# Patient Record
Sex: FEMALE | Race: WHITE | NOT HISPANIC OR LATINO | Employment: UNEMPLOYED | URBAN - NONMETROPOLITAN AREA
[De-identification: names, ages, dates, MRNs, and addresses within clinical notes are randomized per-mention and may not be internally consistent; named-entity substitution may affect disease eponyms.]

---

## 2017-03-09 ENCOUNTER — CLINICAL SUPPORT (OUTPATIENT)
Dept: OBSTETRICS AND GYNECOLOGY | Facility: CLINIC | Age: 22
End: 2017-03-09

## 2017-03-09 DIAGNOSIS — Z30.42 SURVEILLANCE FOR DEPO-PROVERA CONTRACEPTION: Primary | ICD-10-CM

## 2017-03-09 RX ORDER — MEDROXYPROGESTERONE ACETATE 150 MG/ML
150 INJECTION, SUSPENSION INTRAMUSCULAR ONCE
Status: COMPLETED | OUTPATIENT
Start: 2017-03-09 | End: 2017-03-09

## 2017-03-09 RX ADMIN — MEDROXYPROGESTERONE ACETATE 150 MG: 150 INJECTION, SUSPENSION INTRAMUSCULAR at 16:06

## 2017-10-16 ENCOUNTER — OFFICE VISIT (OUTPATIENT)
Dept: OBSTETRICS AND GYNECOLOGY | Facility: CLINIC | Age: 22
End: 2017-10-16

## 2017-10-16 VITALS
HEIGHT: 62 IN | BODY MASS INDEX: 20.8 KG/M2 | WEIGHT: 113 LBS | DIASTOLIC BLOOD PRESSURE: 62 MMHG | SYSTOLIC BLOOD PRESSURE: 122 MMHG

## 2017-10-16 DIAGNOSIS — Z30.09 UNWANTED FERTILITY: Primary | ICD-10-CM

## 2017-10-16 PROCEDURE — 99213 OFFICE O/P EST LOW 20 MIN: CPT | Performed by: OBSTETRICS & GYNECOLOGY

## 2017-10-16 RX ORDER — SODIUM CHLORIDE, SODIUM LACTATE, POTASSIUM CHLORIDE, CALCIUM CHLORIDE 600; 310; 30; 20 MG/100ML; MG/100ML; MG/100ML; MG/100ML
125 INJECTION, SOLUTION INTRAVENOUS CONTINUOUS
Status: CANCELLED | OUTPATIENT
Start: 2017-11-29

## 2017-10-16 RX ORDER — SERTRALINE HYDROCHLORIDE 25 MG/1
25 TABLET, FILM COATED ORAL DAILY
COMMUNITY
End: 2022-10-24

## 2017-10-16 RX ORDER — SODIUM CHLORIDE 0.9 % (FLUSH) 0.9 %
1-10 SYRINGE (ML) INJECTION AS NEEDED
Status: CANCELLED | OUTPATIENT
Start: 2017-11-29

## 2017-10-16 RX ORDER — LAMOTRIGINE 25 MG/1
25 TABLET ORAL 2 TIMES DAILY
COMMUNITY
End: 2022-10-24 | Stop reason: SDUPTHER

## 2017-10-16 RX ORDER — LEVETIRACETAM 250 MG/1
500 TABLET ORAL 2 TIMES DAILY
COMMUNITY
End: 2017-11-14 | Stop reason: DRUGHIGH

## 2017-10-16 NOTE — PROGRESS NOTES
"  Chief Complaint   Patient presents with   • Contraception     tubal consult     Cherelle Acosta is a 22 y.o. year old .  No LMP recorded. Patient has had an injection.  She presents with a chief complaint of Wanting to get her tubes tied.  She would like an Essure method done..         Past Medical History:   Diagnosis Date   • Factor V Leiden mutation      History reviewed. No pertinent surgical history.    Current Outpatient Prescriptions:   •  lamoTRIgine (LaMICtal) 25 MG tablet, Take 25 mg by mouth Daily., Disp: , Rfl:   •  levETIRAcetam (KEPPRA) 250 MG tablet, Take 250 mg by mouth 2 (Two) Times a Day., Disp: , Rfl:   •  sertraline (ZOLOFT) 25 MG tablet, Take 25 mg by mouth Daily., Disp: , Rfl:   No Known Allergies  Smoking status: Current Every Day Smoker                                                   Packs/day: 0.00      Years: 0.00      Smokeless status: Never Used                        Review of Systems   Constitutional: Negative for activity change, appetite change, chills and fever.   Gastrointestinal: Negative for abdominal distention and abdominal pain.   Genitourinary: Negative for difficulty urinating, dysuria, flank pain, genital sores, pelvic pain, vaginal bleeding, vaginal discharge and vaginal pain.        /62  Ht 62\" (157.5 cm)  Wt 113 lb (51.3 kg)  Breastfeeding? No  BMI 20.67 kg/m2    General:  well developed; well nourished  no acute distress   Thyroid: not examined   Lungs:  breathing is unlabored   Heart:  Not performed.   Breasts:  Not performed.   Abdomen: Not performed.   Pelvis: Not performed.     Lab Review   No data reviewed      Imaging   No data reviewed    Assessment      Diagnosis Plan   1. Unwanted fertility            Plan   The patient understands that tubal ligation should be considered permanent and not reversible.  And that if there is any thought in the back of her mind that she would like to  have another child; she should not get her tubes tied.  " Although reversal surgeries are possible, they may not work, and are usually not covered by insurance.  She further understands that the only perfect method of birth control is to not engage in intercourse.  All other methods of birth control have a failure rate.  She understands that tubal ligation has a failure rate.  Should she believe that she is pregnant after her surgery, she should do pregnancy test, and if it is positive, we would want to see her right away as she would have a high risk for having a tubal or an ectopic pregnancy.  She further understands that because she so young that she has about a 33% chance of regretting her decision by the time she turns 30.  We discussed alternate methods of birth control including an IUD but she still wishes to proceed and get her tubes tied.  She will read and sign her tubal papers today.  She would like an Essure method tubal ligation.         This note was electronically signed.    Chuck Canas MD  October 16, 2017

## 2017-11-14 ENCOUNTER — APPOINTMENT (OUTPATIENT)
Dept: PREADMISSION TESTING | Facility: HOSPITAL | Age: 22
End: 2017-11-14

## 2017-11-14 ENCOUNTER — OFFICE VISIT (OUTPATIENT)
Dept: OBSTETRICS AND GYNECOLOGY | Facility: CLINIC | Age: 22
End: 2017-11-14

## 2017-11-14 VITALS
BODY MASS INDEX: 20.24 KG/M2 | DIASTOLIC BLOOD PRESSURE: 54 MMHG | OXYGEN SATURATION: 99 % | RESPIRATION RATE: 18 BRPM | HEIGHT: 62 IN | HEART RATE: 85 BPM | SYSTOLIC BLOOD PRESSURE: 90 MMHG | WEIGHT: 110 LBS

## 2017-11-14 VITALS
BODY MASS INDEX: 20.61 KG/M2 | DIASTOLIC BLOOD PRESSURE: 64 MMHG | SYSTOLIC BLOOD PRESSURE: 98 MMHG | WEIGHT: 112 LBS | HEIGHT: 62 IN

## 2017-11-14 DIAGNOSIS — Z30.09 UNWANTED FERTILITY: Primary | ICD-10-CM

## 2017-11-14 DIAGNOSIS — Z30.09 UNWANTED FERTILITY: ICD-10-CM

## 2017-11-14 LAB
BASOPHILS # BLD AUTO: 0.03 10*3/MM3 (ref 0–0.2)
BASOPHILS NFR BLD AUTO: 0.4 % (ref 0–2)
DEPRECATED RDW RBC AUTO: 38 FL (ref 36.4–46.3)
EOSINOPHIL # BLD AUTO: 0.03 10*3/MM3 (ref 0–0.7)
EOSINOPHIL NFR BLD AUTO: 0.4 % (ref 0–7)
ERYTHROCYTE [DISTWIDTH] IN BLOOD BY AUTOMATED COUNT: 11.8 % (ref 11.5–14.5)
HCT VFR BLD AUTO: 40.3 % (ref 35–45)
HGB BLD-MCNC: 13.8 G/DL (ref 12–15.5)
IMM GRANULOCYTES # BLD: 0.01 10*3/MM3 (ref 0–0.02)
IMM GRANULOCYTES NFR BLD: 0.1 % (ref 0–0.5)
LYMPHOCYTES # BLD AUTO: 2.83 10*3/MM3 (ref 0.6–4.2)
LYMPHOCYTES NFR BLD AUTO: 36.5 % (ref 10–50)
MCH RBC QN AUTO: 30.3 PG (ref 26.5–34)
MCHC RBC AUTO-ENTMCNC: 34.2 G/DL (ref 31.4–36)
MCV RBC AUTO: 88.4 FL (ref 80–98)
MONOCYTES # BLD AUTO: 0.52 10*3/MM3 (ref 0–0.9)
MONOCYTES NFR BLD AUTO: 6.7 % (ref 0–12)
NEUTROPHILS # BLD AUTO: 4.34 10*3/MM3 (ref 2–8.6)
NEUTROPHILS NFR BLD AUTO: 55.9 % (ref 37–80)
PLATELET # BLD AUTO: 277 10*3/MM3 (ref 150–450)
PMV BLD AUTO: 11.1 FL (ref 8–12)
RBC # BLD AUTO: 4.56 10*6/MM3 (ref 3.77–5.16)
WBC NRBC COR # BLD: 7.76 10*3/MM3 (ref 3.2–9.8)

## 2017-11-14 PROCEDURE — 99213 OFFICE O/P EST LOW 20 MIN: CPT | Performed by: OBSTETRICS & GYNECOLOGY

## 2017-11-14 PROCEDURE — 85025 COMPLETE CBC W/AUTO DIFF WBC: CPT | Performed by: OBSTETRICS & GYNECOLOGY

## 2017-11-14 PROCEDURE — 36415 COLL VENOUS BLD VENIPUNCTURE: CPT

## 2017-11-14 RX ORDER — LEVETIRACETAM 500 MG/1
500 TABLET ORAL 2 TIMES DAILY
COMMUNITY
End: 2022-10-24 | Stop reason: DRUGHIGH

## 2017-11-14 NOTE — H&P
"Cherelle Acosta  : 1995  MRN: [id number]  CSN: [id number]    History and Physical    Cherelle Acosta is a 22 y.o. year old  who presents for a preoperative history and physical prior to undergoing an exam under anesthesia and Essure procedure.  Cherelle would like to get her tubes tied.  She would like to use Depo-Provera for the 3 months until she has her HSG performed..    Past Medical History:   Diagnosis Date   • Factor V Leiden mutation      OB History      Para Term  AB Living    2 2 2 0 0 2    SAB TAB Ectopic Multiple Live Births    0 0 0 0 2        No past surgical history on file.  Smoking status: Current Every Day Smoker                                                   Packs/day: 0.00      Years: 0.00      Smokeless status: Never Used                          Current Outpatient Prescriptions:   •  lamoTRIgine (LaMICtal) 25 MG tablet, Take 25 mg by mouth Daily., Disp: , Rfl:   •  levETIRAcetam (KEPPRA) 250 MG tablet, Take 500 mg by mouth 2 (Two) Times a Day., Disp: , Rfl:   •  sertraline (ZOLOFT) 25 MG tablet, Take 25 mg by mouth Daily., Disp: , Rfl:   Prior to Admission medications    Medication Sig Start Date End Date Taking? Authorizing Provider   lamoTRIgine (LaMICtal) 25 MG tablet Take 25 mg by mouth Daily.   Yes Historical Provider, MD   levETIRAcetam (KEPPRA) 250 MG tablet Take 500 mg by mouth 2 (Two) Times a Day.   Yes Historical Provider, MD   sertraline (ZOLOFT) 25 MG tablet Take 25 mg by mouth Daily.   Yes Historical Provider, MD       No Known Allergies    Review of Systems   Constitutional: Negative for activity change, appetite change, chills and fever.   Gastrointestinal: Negative for abdominal distention and abdominal pain.   Genitourinary: Negative for difficulty urinating, dysuria, flank pain, genital sores, pelvic pain, vaginal bleeding, vaginal discharge and vaginal pain.           BP 98/64  Ht 62\" (157.5 cm)  Wt 112 lb (50.8 kg)  BMI 20.49 " kg/m2  General: well developed; well nourished  no acute distress   thryoid: normal to inspection and palpation   Heart: regular rate and rhythm   Lungs: breathing is unlabored  clear to auscultation bilaterally   Abdomen: soft, non-tender; no masses  no umbilical or inginual hernias are present  no hepato-splenomegaly   Pelvic: Not performed.  The pelvic exam is deferred to the OR   Neuro: Alert and oriented x 3, reflexes normal and symmetric   Labs  Lab Results   Component Value Date     01/24/2016    HGB 10.9 (L) 01/24/2016    HCT 31.2 (L) 01/24/2016    WBC 16.2 (H) 01/24/2016     (L) 10/27/2015    K 3.6 10/27/2015     10/27/2015    CO2 20 (L) 10/27/2015    BUN 8 10/27/2015    CREATININE 0.4 (L) 10/27/2015    GLUCOSE 75 10/27/2015    ALBUMIN 3.4 10/27/2015    CALCIUM 9.3 10/27/2015    AST 18 10/27/2015    ALT 25 10/27/2015    BILITOT 0.5 10/27/2015       Assessment   1. Unwanted fertility, desires sterility     Plan   1.  The patient will be taken the operating room to undergo an exam under anesthesia and Essure procedure.  If we are unable to complete the Essure procedure she would like for us to go from above.  Today I discussed with Cherelle the risks, benefits, and alternativesof  her upcoming surgical procedure. The risks that were discussed included, but  were not limited to: pain, bleeding including the need and risks of transfusion to  which she would have no objection, infection at the site of surgery, damage to  the adjacent surrounding organs,  catheter introduced urinary tract infections and  the small risk for deep vein thrombosis were all explained. Additionally, the small  risk for reoperation in the event of unanticipated bleeding or surgical injury was  discussed.  All of her questions were answered fully.  She left with a very clear  understanding of the preoperative surgical indications and the nature of the  surgery for which she is scheduled.  She understands to be NPO after  midnight.            This document has been electronically signed by Chuck Canas MD on November 14, 2017 10:11 AM

## 2017-11-14 NOTE — DISCHARGE INSTRUCTIONS
[facility]  Pre-op Information and Guidelines    You will be called after 2 p.m. the day before your surgery (Friday for Monday surgery) and notified of your time for arrival and approximate surgery time.  If you have not received a call by 4P.M., please contact Same Day Surgery at [phone] of if outside [facility] call [phone].    Please Follow these Important Safety Guidelines:    • The morning of your procedure, take only the medications listed below with   A sip of water:_____________________________________________       ______________________________________________    • DO NOT eat or drink anything after 12:00 midnight the night before surgery  Specific instructions concerning drinking clear liquids will be discussed during  the pre-surgery instruction call the day before your surgery.    • If you take a blood thinner (ex. Plavix, Coumadin, aspirin), ask your doctor when to stop it before surgery  STOP DATE: _________________    • Only 2 visitors are allowed in patient rooms at a time  Your visitors will be asked to wait in the lobby until the admission process is complete with the exception of a parent with a child and patients in need of special assistance.    • YOU CANNOT DRIVE YOURSELF HOME  You must be accompanied by someone who will be responsible for driving you home after surgery and for your care at home.    • DO NOT chew gum, use breath mints, hard candy, or smoke the day of surgery  • DO NOT drink alcohol for at least 24 hours before your surgery  • DO NOT wear any jewelry and remove all body piercing before coming to the hospital  • DO NOT wear make-up to the hospital  • If you are having surgery on an extremity (arm/leg/foot) remove nail polish/artificial nails on the surgical side  • Clothing, glasses, contacts, dentures, and hairpieces must be removed before surgery  • Bathe the night before or the morning of your surgery and do not use powders/lotions on  skin.

## 2017-11-29 ENCOUNTER — ANESTHESIA EVENT (OUTPATIENT)
Dept: PERIOP | Facility: HOSPITAL | Age: 22
End: 2017-11-29

## 2017-11-29 ENCOUNTER — HOSPITAL ENCOUNTER (OUTPATIENT)
Facility: HOSPITAL | Age: 22
Setting detail: HOSPITAL OUTPATIENT SURGERY
Discharge: HOME | End: 2017-11-29
Attending: OBSTETRICS & GYNECOLOGY | Admitting: OBSTETRICS & GYNECOLOGY

## 2017-11-29 ENCOUNTER — ANESTHESIA (OUTPATIENT)
Dept: PERIOP | Facility: HOSPITAL | Age: 22
End: 2017-11-29

## 2017-11-29 VITALS
RESPIRATION RATE: 18 BRPM | OXYGEN SATURATION: 100 % | DIASTOLIC BLOOD PRESSURE: 77 MMHG | TEMPERATURE: 98.3 F | WEIGHT: 110.01 LBS | HEIGHT: 62 IN | HEART RATE: 72 BPM | BODY MASS INDEX: 20.24 KG/M2 | SYSTOLIC BLOOD PRESSURE: 111 MMHG

## 2017-11-29 DIAGNOSIS — Z30.09 UNWANTED FERTILITY: ICD-10-CM

## 2017-11-29 LAB
AMPHET+METHAMPHET UR QL: NEGATIVE
B-HCG UR QL: NEGATIVE
BARBITURATES UR QL SCN: NEGATIVE
BENZODIAZ UR QL SCN: NEGATIVE
CANNABINOIDS SERPL QL: POSITIVE
COCAINE UR QL: NEGATIVE
METHADONE UR QL SCN: NEGATIVE
OPIATES UR QL: NEGATIVE
OXYCODONE UR QL SCN: NEGATIVE

## 2017-11-29 PROCEDURE — 80307 DRUG TEST PRSMV CHEM ANLYZR: CPT | Performed by: ANESTHESIOLOGY

## 2017-11-29 PROCEDURE — 25010000002 KETOROLAC TROMETHAMINE PER 15 MG: Performed by: NURSE ANESTHETIST, CERTIFIED REGISTERED

## 2017-11-29 PROCEDURE — 25010000002 DEXAMETHASONE PER 1 MG: Performed by: NURSE ANESTHETIST, CERTIFIED REGISTERED

## 2017-11-29 PROCEDURE — 58565 HYSTEROSCOPY STERILIZATION: CPT | Performed by: OBSTETRICS & GYNECOLOGY

## 2017-11-29 PROCEDURE — 25010000002 FENTANYL CITRATE (PF) 100 MCG/2ML SOLUTION: Performed by: NURSE ANESTHETIST, CERTIFIED REGISTERED

## 2017-11-29 PROCEDURE — 25010000002 PROPOFOL 10 MG/ML EMULSION: Performed by: NURSE ANESTHETIST, CERTIFIED REGISTERED

## 2017-11-29 PROCEDURE — 25010000002 MIDAZOLAM PER 1 MG: Performed by: NURSE ANESTHETIST, CERTIFIED REGISTERED

## 2017-11-29 PROCEDURE — 25010000002 HYDROMORPHONE PER 4 MG: Performed by: NURSE ANESTHETIST, CERTIFIED REGISTERED

## 2017-11-29 PROCEDURE — 81025 URINE PREGNANCY TEST: CPT | Performed by: OBSTETRICS & GYNECOLOGY

## 2017-11-29 PROCEDURE — 25010000002 ONDANSETRON PER 1 MG: Performed by: NURSE ANESTHETIST, CERTIFIED REGISTERED

## 2017-11-29 DEVICE — SYS CONTRL BIRTH ESSURE PERM ST: Type: IMPLANTABLE DEVICE | Status: FUNCTIONAL

## 2017-11-29 RX ORDER — FLUMAZENIL 0.1 MG/ML
0.2 INJECTION INTRAVENOUS AS NEEDED
Status: DISCONTINUED | OUTPATIENT
Start: 2017-11-29 | End: 2017-11-29 | Stop reason: HOSPADM

## 2017-11-29 RX ORDER — MEDROXYPROGESTERONE ACETATE 150 MG/ML
150 INJECTION, SUSPENSION INTRAMUSCULAR ONCE
Status: COMPLETED | OUTPATIENT
Start: 2017-11-29 | End: 2017-11-29

## 2017-11-29 RX ORDER — EPHEDRINE SULFATE 50 MG/ML
5 INJECTION, SOLUTION INTRAVENOUS ONCE AS NEEDED
Status: DISCONTINUED | OUTPATIENT
Start: 2017-11-29 | End: 2017-11-29 | Stop reason: HOSPADM

## 2017-11-29 RX ORDER — ACETAMINOPHEN 325 MG/1
650 TABLET ORAL ONCE AS NEEDED
Status: DISCONTINUED | OUTPATIENT
Start: 2017-11-29 | End: 2017-11-29 | Stop reason: HOSPADM

## 2017-11-29 RX ORDER — OXYCODONE HYDROCHLORIDE AND ACETAMINOPHEN 5; 325 MG/1; MG/1
1 TABLET ORAL ONCE AS NEEDED
Status: DISCONTINUED | OUTPATIENT
Start: 2017-11-29 | End: 2017-11-29 | Stop reason: HOSPADM

## 2017-11-29 RX ORDER — IBUPROFEN 800 MG/1
800 TABLET ORAL EVERY 6 HOURS
Status: DISCONTINUED | OUTPATIENT
Start: 2017-11-29 | End: 2017-11-29 | Stop reason: HOSPADM

## 2017-11-29 RX ORDER — LIDOCAINE HYDROCHLORIDE 20 MG/ML
INJECTION, SOLUTION INFILTRATION; PERINEURAL AS NEEDED
Status: DISCONTINUED | OUTPATIENT
Start: 2017-11-29 | End: 2017-11-29 | Stop reason: SURG

## 2017-11-29 RX ORDER — KETOROLAC TROMETHAMINE 30 MG/ML
INJECTION, SOLUTION INTRAMUSCULAR; INTRAVENOUS AS NEEDED
Status: DISCONTINUED | OUTPATIENT
Start: 2017-11-29 | End: 2017-11-29 | Stop reason: SURG

## 2017-11-29 RX ORDER — DIPHENHYDRAMINE HYDROCHLORIDE 50 MG/ML
12.5 INJECTION INTRAMUSCULAR; INTRAVENOUS
Status: DISCONTINUED | OUTPATIENT
Start: 2017-11-29 | End: 2017-11-29 | Stop reason: HOSPADM

## 2017-11-29 RX ORDER — ACETAMINOPHEN 650 MG/1
650 SUPPOSITORY RECTAL ONCE AS NEEDED
Status: DISCONTINUED | OUTPATIENT
Start: 2017-11-29 | End: 2017-11-29 | Stop reason: HOSPADM

## 2017-11-29 RX ORDER — ONDANSETRON 2 MG/ML
INJECTION INTRAMUSCULAR; INTRAVENOUS AS NEEDED
Status: DISCONTINUED | OUTPATIENT
Start: 2017-11-29 | End: 2017-11-29 | Stop reason: SURG

## 2017-11-29 RX ORDER — DEXAMETHASONE SODIUM PHOSPHATE 4 MG/ML
INJECTION, SOLUTION INTRA-ARTICULAR; INTRALESIONAL; INTRAMUSCULAR; INTRAVENOUS; SOFT TISSUE AS NEEDED
Status: DISCONTINUED | OUTPATIENT
Start: 2017-11-29 | End: 2017-11-29 | Stop reason: SURG

## 2017-11-29 RX ORDER — SODIUM CHLORIDE 0.9 % (FLUSH) 0.9 %
1-10 SYRINGE (ML) INJECTION AS NEEDED
Status: DISCONTINUED | OUTPATIENT
Start: 2017-11-29 | End: 2017-11-29 | Stop reason: HOSPADM

## 2017-11-29 RX ORDER — FENTANYL CITRATE 50 UG/ML
INJECTION, SOLUTION INTRAMUSCULAR; INTRAVENOUS AS NEEDED
Status: DISCONTINUED | OUTPATIENT
Start: 2017-11-29 | End: 2017-11-29 | Stop reason: SURG

## 2017-11-29 RX ORDER — SODIUM CHLORIDE, SODIUM LACTATE, POTASSIUM CHLORIDE, CALCIUM CHLORIDE 600; 310; 30; 20 MG/100ML; MG/100ML; MG/100ML; MG/100ML
125 INJECTION, SOLUTION INTRAVENOUS CONTINUOUS
Status: DISCONTINUED | OUTPATIENT
Start: 2017-11-29 | End: 2017-11-29 | Stop reason: HOSPADM

## 2017-11-29 RX ORDER — ONDANSETRON 2 MG/ML
4 INJECTION INTRAMUSCULAR; INTRAVENOUS ONCE AS NEEDED
Status: DISCONTINUED | OUTPATIENT
Start: 2017-11-29 | End: 2017-11-29 | Stop reason: HOSPADM

## 2017-11-29 RX ORDER — PROMETHAZINE HYDROCHLORIDE 25 MG/ML
12.5 INJECTION, SOLUTION INTRAMUSCULAR; INTRAVENOUS ONCE AS NEEDED
Status: DISCONTINUED | OUTPATIENT
Start: 2017-11-29 | End: 2017-11-29 | Stop reason: HOSPADM

## 2017-11-29 RX ORDER — NALOXONE HCL 0.4 MG/ML
0.2 VIAL (ML) INJECTION AS NEEDED
Status: DISCONTINUED | OUTPATIENT
Start: 2017-11-29 | End: 2017-11-29 | Stop reason: HOSPADM

## 2017-11-29 RX ORDER — MIDAZOLAM HYDROCHLORIDE 1 MG/ML
INJECTION INTRAMUSCULAR; INTRAVENOUS AS NEEDED
Status: DISCONTINUED | OUTPATIENT
Start: 2017-11-29 | End: 2017-11-29 | Stop reason: SURG

## 2017-11-29 RX ORDER — PROPOFOL 10 MG/ML
VIAL (ML) INTRAVENOUS AS NEEDED
Status: DISCONTINUED | OUTPATIENT
Start: 2017-11-29 | End: 2017-11-29 | Stop reason: SURG

## 2017-11-29 RX ORDER — HYDROMORPHONE HCL 110MG/55ML
0.5 PATIENT CONTROLLED ANALGESIA SYRINGE INTRAVENOUS
Status: DISCONTINUED | OUTPATIENT
Start: 2017-11-29 | End: 2017-11-29 | Stop reason: HOSPADM

## 2017-11-29 RX ORDER — LABETALOL HYDROCHLORIDE 5 MG/ML
5 INJECTION, SOLUTION INTRAVENOUS
Status: DISCONTINUED | OUTPATIENT
Start: 2017-11-29 | End: 2017-11-29 | Stop reason: HOSPADM

## 2017-11-29 RX ORDER — IBUPROFEN 600 MG/1
600 TABLET ORAL EVERY 6 HOURS PRN
Qty: 30 TABLET | Refills: 1 | Status: SHIPPED | OUTPATIENT
Start: 2017-11-29 | End: 2022-11-22 | Stop reason: ALTCHOICE

## 2017-11-29 RX ADMIN — PROPOFOL 150 MG: 10 INJECTION, EMULSION INTRAVENOUS at 10:09

## 2017-11-29 RX ADMIN — FENTANYL CITRATE 50 MCG: 50 INJECTION, SOLUTION INTRAMUSCULAR; INTRAVENOUS at 10:05

## 2017-11-29 RX ADMIN — SODIUM CHLORIDE, POTASSIUM CHLORIDE, SODIUM LACTATE AND CALCIUM CHLORIDE 125 ML/HR: 600; 310; 30; 20 INJECTION, SOLUTION INTRAVENOUS at 07:06

## 2017-11-29 RX ADMIN — PROPOFOL 50 MG: 10 INJECTION, EMULSION INTRAVENOUS at 10:10

## 2017-11-29 RX ADMIN — HYDROMORPHONE HYDROCHLORIDE 0.5 MG: 2 INJECTION INTRAMUSCULAR; INTRAVENOUS; SUBCUTANEOUS at 11:11

## 2017-11-29 RX ADMIN — FENTANYL CITRATE 50 MCG: 50 INJECTION, SOLUTION INTRAMUSCULAR; INTRAVENOUS at 10:03

## 2017-11-29 RX ADMIN — MEDROXYPROGESTERONE ACETATE 150 MG: 150 INJECTION, SUSPENSION INTRAMUSCULAR at 12:35

## 2017-11-29 RX ADMIN — HYDROMORPHONE HYDROCHLORIDE 0.5 MG: 2 INJECTION INTRAMUSCULAR; INTRAVENOUS; SUBCUTANEOUS at 11:00

## 2017-11-29 RX ADMIN — LIDOCAINE HYDROCHLORIDE 50 MG: 20 INJECTION, SOLUTION INFILTRATION; PERINEURAL at 10:09

## 2017-11-29 RX ADMIN — DEXAMETHASONE SODIUM PHOSPHATE 4 MG: 4 INJECTION, SOLUTION INTRAMUSCULAR; INTRAVENOUS at 10:13

## 2017-11-29 RX ADMIN — MIDAZOLAM 2 MG: 1 INJECTION INTRAMUSCULAR; INTRAVENOUS at 10:03

## 2017-11-29 RX ADMIN — ONDANSETRON 4 MG: 2 INJECTION INTRAMUSCULAR; INTRAVENOUS at 10:13

## 2017-11-29 RX ADMIN — KETOROLAC TROMETHAMINE 30 MG: 30 INJECTION, SOLUTION INTRAMUSCULAR at 10:13

## 2017-11-29 NOTE — ANESTHESIA PROCEDURE NOTES
Airway  Urgency: elective    Airway not difficult    General Information and Staff    Patient location during procedure: OR  CRNA: TIMOTHY MCKINLEY    Indications and Patient Condition    Preoxygenated: yes  Mask difficulty assessment: 0 - not attempted    Final Airway Details  Final airway type: supraglottic airway      Successful airway: classic  Size 4    Number of attempts at approach: 1    Additional Comments  Lips and teeth in preanesthetic condition

## 2017-11-29 NOTE — ANESTHESIA PREPROCEDURE EVALUATION
Anesthesia Evaluation     NPO Solid Status: > 8 hours  NPO Liquid Status: > 8 hours     Airway   Mallampati: II  TM distance: >3 FB  Neck ROM: full  no difficulty expected  Dental    (+) poor dentition    Pulmonary     breath sounds clear to auscultation  (+) a smoker Current,   Cardiovascular     Rhythm: regular  Rate: normal      ROS comment: Factor V leiden deficiency noted in history and as such is at higher risk for thrombotic phenomena.    Neuro/Psych  (+) seizures well controlled, psychiatric history Anxiety,    GI/Hepatic/Renal/Endo      Musculoskeletal     Abdominal   (+) scaphoid    Abdomen: soft.   Substance History   (+) drug use (admits to marijuana use but denies any other drug use such as methamphetamine)     OB/GYN          Other   (+) arthritis                                   Anesthesia Plan    ASA 2     general     intravenous induction   Anesthetic plan and risks discussed with patient.

## 2017-11-29 NOTE — ANESTHESIA POSTPROCEDURE EVALUATION
Patient: Cherelle Acosta    Procedure Summary     Date Anesthesia Start Anesthesia Stop Room / Location    11/29/17 1003 1037 [facility] OR 10 / [facility] OR       Procedure Diagnosis Surgeon Provider    EXAMINATION UNDER ANESTHESIA, HYSTEROSCOPY WITH  ESSURE PROCEDURE (N/A Vagina) Unwanted fertility  (Unwanted fertility [Z30.09]) MD Jason Small MD          Anesthesia Type: general  Last vitals  BP   117/77 (11/29/17 1035)   Temp   97.8 °F (36.6 °C) (11/29/17 1035)   Pulse   83 (11/29/17 1035)   Resp   14 (11/29/17 1035)     SpO2   99 % (11/29/17 1035)     Post Anesthesia Care and Evaluation    Patient location during evaluation: PACU  Patient participation: complete - patient participated  Level of consciousness: awake and awake and alert  Pain management: adequate  Airway patency: patent  Anesthetic complications: No anesthetic complications  PONV Status: none  Cardiovascular status: acceptable  Respiratory status: acceptable  Hydration status: acceptable

## 2017-12-07 ENCOUNTER — OFFICE VISIT (OUTPATIENT)
Dept: OBSTETRICS AND GYNECOLOGY | Facility: CLINIC | Age: 22
End: 2017-12-07

## 2017-12-07 DIAGNOSIS — Z09 EXAMINATION FOLLOWING SURGERY: Primary | ICD-10-CM

## 2017-12-07 DIAGNOSIS — Z01.812 PRE-PROCEDURE LAB EXAM: ICD-10-CM

## 2017-12-07 DIAGNOSIS — Z98.51 STATUS POST TUBAL LIGATION: ICD-10-CM

## 2017-12-07 PROCEDURE — 99024 POSTOP FOLLOW-UP VISIT: CPT | Performed by: OBSTETRICS & GYNECOLOGY

## 2017-12-07 NOTE — PROGRESS NOTES
HISTORY:  The patient presents for her 1 week postop check following an Essure procedure.  She is doing well at home and has no complaints.      PHYSICAL EXAMINATION:   IN GENERAL:  A well-developed, well-nourished female in no acute distress.   ABDOMEN:  Soft and nontender.                    ASSESSMENT:  Surgical follow-up.     PLAN:  The patient understands she needs to have her HSG done in 3 months to ensure that her tubes are blocked.  She did receive Depo-Provera the day of surgery.  She is to use a backup method for the next month.  Otherwise, she may resume all normal activities.

## 2018-03-07 ENCOUNTER — TELEPHONE (OUTPATIENT)
Dept: GENERAL RADIOLOGY | Facility: HOSPITAL | Age: 23
End: 2018-03-07

## 2022-10-24 ENCOUNTER — OFFICE VISIT (OUTPATIENT)
Dept: FAMILY MEDICINE CLINIC | Facility: CLINIC | Age: 27
End: 2022-10-24

## 2022-10-24 VITALS
SYSTOLIC BLOOD PRESSURE: 80 MMHG | OXYGEN SATURATION: 97 % | HEART RATE: 94 BPM | HEIGHT: 64 IN | WEIGHT: 110 LBS | BODY MASS INDEX: 18.78 KG/M2 | DIASTOLIC BLOOD PRESSURE: 64 MMHG

## 2022-10-24 DIAGNOSIS — D68.51 FACTOR V LEIDEN MUTATION: Chronic | ICD-10-CM

## 2022-10-24 DIAGNOSIS — R10.11 RUQ ABDOMINAL PAIN: ICD-10-CM

## 2022-10-24 DIAGNOSIS — Z76.89 ENCOUNTER TO ESTABLISH CARE: ICD-10-CM

## 2022-10-24 DIAGNOSIS — F12.90 MARIJUANA USE: ICD-10-CM

## 2022-10-24 DIAGNOSIS — Z23 NEED FOR VACCINATION: ICD-10-CM

## 2022-10-24 DIAGNOSIS — G43.709 CHRONIC MIGRAINE WITHOUT AURA WITHOUT STATUS MIGRAINOSUS, NOT INTRACTABLE: Chronic | ICD-10-CM

## 2022-10-24 DIAGNOSIS — F17.200 SMOKER: ICD-10-CM

## 2022-10-24 DIAGNOSIS — F33.1 MAJOR DEPRESSIVE DISORDER, RECURRENT EPISODE, MODERATE DEGREE: Chronic | ICD-10-CM

## 2022-10-24 DIAGNOSIS — F41.9 ANXIETY: Chronic | ICD-10-CM

## 2022-10-24 DIAGNOSIS — Z00.00 ANNUAL PHYSICAL EXAM: Primary | ICD-10-CM

## 2022-10-24 DIAGNOSIS — G40.909 SEIZURE DISORDER: Chronic | ICD-10-CM

## 2022-10-24 DIAGNOSIS — F31.12 BIPOLAR 1 DISORDER, MANIC, MODERATE: Chronic | ICD-10-CM

## 2022-10-24 DIAGNOSIS — D17.9 LIPOMA, UNSPECIFIED SITE: Chronic | ICD-10-CM

## 2022-10-24 PROCEDURE — 99385 PREV VISIT NEW AGE 18-39: CPT | Performed by: NURSE PRACTITIONER

## 2022-10-24 PROCEDURE — 90686 IIV4 VACC NO PRSV 0.5 ML IM: CPT | Performed by: NURSE PRACTITIONER

## 2022-10-24 PROCEDURE — 90471 IMMUNIZATION ADMIN: CPT | Performed by: NURSE PRACTITIONER

## 2022-10-24 PROCEDURE — 2014F MENTAL STATUS ASSESS: CPT | Performed by: NURSE PRACTITIONER

## 2022-10-24 PROCEDURE — 3008F BODY MASS INDEX DOCD: CPT | Performed by: NURSE PRACTITIONER

## 2022-10-24 PROCEDURE — 90472 IMMUNIZATION ADMIN EACH ADD: CPT | Performed by: NURSE PRACTITIONER

## 2022-10-24 PROCEDURE — 90715 TDAP VACCINE 7 YRS/> IM: CPT | Performed by: NURSE PRACTITIONER

## 2022-10-24 RX ORDER — NICOTINE 21 MG/24HR
1 PATCH, TRANSDERMAL 24 HOURS TRANSDERMAL EVERY 24 HOURS
Qty: 30 PATCH | Refills: 3 | Status: SHIPPED | OUTPATIENT
Start: 2022-10-24

## 2022-10-24 RX ORDER — ERENUMAB-AOOE 140 MG/ML
INJECTION, SOLUTION SUBCUTANEOUS
COMMUNITY
Start: 2022-09-25

## 2022-10-24 RX ORDER — ARIPIPRAZOLE 5 MG/1
5 TABLET ORAL DAILY
Qty: 30 TABLET | Refills: 1 | Status: SHIPPED | OUTPATIENT
Start: 2022-10-24 | End: 2022-11-23 | Stop reason: SDUPTHER

## 2022-10-24 RX ORDER — LACOSAMIDE 100 MG/1
100 TABLET ORAL 2 TIMES DAILY
COMMUNITY
Start: 2022-10-10

## 2022-10-24 RX ORDER — HYDROXYZINE PAMOATE 25 MG/1
25 CAPSULE ORAL 3 TIMES DAILY PRN
Qty: 90 CAPSULE | Refills: 1 | Status: SHIPPED | OUTPATIENT
Start: 2022-10-24 | End: 2022-11-23 | Stop reason: SDUPTHER

## 2022-10-24 RX ORDER — DULOXETIN HYDROCHLORIDE 30 MG/1
30 CAPSULE, DELAYED RELEASE ORAL DAILY
Qty: 30 CAPSULE | Refills: 1 | Status: SHIPPED | OUTPATIENT
Start: 2022-10-24 | End: 2022-11-23 | Stop reason: SDUPTHER

## 2022-10-24 RX ORDER — LAMOTRIGINE 25 MG/1
25 TABLET ORAL 2 TIMES DAILY
Qty: 60 TABLET | Refills: 1 | Status: SHIPPED | OUTPATIENT
Start: 2022-10-24 | End: 2022-11-23 | Stop reason: SDUPTHER

## 2022-10-24 RX ORDER — LEVETIRACETAM 1000 MG/1
1500 TABLET ORAL 2 TIMES DAILY
COMMUNITY
Start: 2022-10-10

## 2022-10-24 NOTE — PROGRESS NOTES
CC: Establish Care (When younger was told she has a fatty tumor on abdomen is now hurting )      Subjective:  Cherelle Acosta is a 27 y.o. female who presents for     Patient presents to office to establish care today.  She is due for checkup with labs.    CMH: Seizure disorder in which she is followed by neurology for, migraines, Factor V mutation in which she takes ASA for, Depression/Anxiety, and Bipolar D/O  SxHx: Hysteroscopy with Tubal ligation, Vagus nerve stimulator implanted for Epilepsy  FH: Mother DM, Asthma, Epilepsy, and Factor V mutation, Brother Asthma, Son with Epilepsy and Autism  SH: Smokes 1/2 ppd since age 17, Denies alcohol intake, Smokes marijuana when she has a seizure    She is due for Pap smear. [facility] will get this done with MARKELL Tucker  She is due for COVID-19 vaccine, Tdap, flu, and pneumococcal vaccines. [facility] is still researching COVID vaccine. Is willing to update Tdap and flu shots today.    Her blood pressure is 80/64 in office today.  Patient states that this is normal for her that it is always this low.    Is having a bad episode of depression and anxiety. States son is about to have brain surgery and her anxiety is really bad right now. BRADLY 7 score is 20 and her PHQ-9 score is 21. Denies HI/SI. She has been on Citalopram, Risperdal, Zoloft, and Prozac in the past without resolution of symptoms. States she has episodes of anger also. Doesn't sleep well. States takes forever to fall asleep and wakes up several times during the night.     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 Now having pain in this area.Pain radiates to the LUQ and her ribs. States this has worsened over the last 6 months. Pain is described as sharp and throbbing. Rates at 7-8 on pain scale. States she has to lay down and take Tylenol to get relief of the pain.       The following portions of the patient's history were reviewed and updated as appropriate:  allergies, current medications, past family history, past medical history, past social history, past surgical history and problem list.    Past Medical History:   Diagnosis Date   • Arthritis    • Factor V Leiden mutation (HCC)    • Manic bipolar I disorder (HCC)    • Seizure (HCC)     last seizure 2 weeks ago         Current Outpatient Medications:   •  Aimovig 140 MG/ML prefilled syringe, INJECT 1 ML SUBCUTANEOUSLY ONCE A MONTH, Disp: , Rfl:   •  ibuprofen (ADVIL,MOTRIN) 600 MG tablet, Take 1 tablet by mouth Every 6 (Six) Hours As Needed for Mild Pain ., Disp: 30 tablet, Rfl: 1  •  lacosamide (VIMPAT) 100 MG tablet tablet, Take 1 tablet by mouth 2 (Two) Times a Day., Disp: , Rfl:   •  lamoTRIgine (LaMICtal) 25 MG tablet, Take 1 tablet by mouth 2 (Two) Times a Day., Disp: 60 tablet, Rfl: 1  •  levETIRAcetam (KEPPRA) 1000 MG tablet, Take 1.5 tablets by mouth 2 (Two) Times a Day., Disp: , Rfl:   •  ARIPiprazole (Abilify) 5 MG tablet, Take 1 tablet by mouth Daily., Disp: 30 tablet, Rfl: 1  •  DULoxetine (CYMBALTA) 30 MG capsule, Take 1 capsule by mouth Daily., Disp: 30 capsule, Rfl: 1  •  hydrOXYzine pamoate (Vistaril) 25 MG capsule, Take 1 capsule by mouth 3 (Three) Times a Day As Needed for Anxiety., Disp: 90 capsule, Rfl: 1  •  nicotine (Nicoderm CQ) 21 MG/24HR patch, Place 1 patch on the skin as directed by provider Daily., Disp: 30 patch, Rfl: 3    Review of Systems    Review of Systems   Constitutional: Negative.    HENT: Negative.    Eyes: Negative.    Respiratory: Negative.    Cardiovascular: Negative.    Gastrointestinal: Positive for abdominal pain.        Fatty tumor the RUQ of abdomen   Endocrine: Negative.    Genitourinary: Negative.    Musculoskeletal: Negative.    Skin: Negative.    Allergic/Immunologic: Negative.    Neurological: Negative.    Hematological: Negative.    Psychiatric/Behavioral: Positive for agitation, decreased concentration, dysphoric mood and sleep disturbance. Negative for confusion,  "hallucinations, self-injury and suicidal ideas. The patient is nervous/anxious.    All other systems reviewed and are negative.      Objective  Vitals:    10/24/22 1330   BP: (!) 80/64   Pulse: 94   SpO2: 97%   Weight: 49.9 kg (110 lb)   Height: 162.6 cm (64\")     Body mass index is 18.88 kg/m².    Physical Exam    Physical Exam  Vitals and nursing note reviewed.   Constitutional:       General: She is not in acute distress.     Appearance: Normal appearance. She is well-developed. She is not ill-appearing, toxic-appearing or diaphoretic.   HENT:      Head: Normocephalic and atraumatic.   Eyes:      General: No scleral icterus.        Right eye: No discharge.         Left eye: No discharge.      Extraocular Movements: Extraocular movements intact.      Conjunctiva/sclera: Conjunctivae normal.   Neck:      Vascular: No carotid bruit.   Cardiovascular:      Rate and Rhythm: Normal rate and regular rhythm.      Pulses: Normal pulses.      Heart sounds: Normal heart sounds. No murmur heard.    No friction rub. No gallop.   Pulmonary:      Effort: Pulmonary effort is normal. No respiratory distress.      Breath sounds: Normal breath sounds. No stridor. No wheezing, rhonchi or rales.   Chest:      Chest wall: No tenderness.   Abdominal:      General: Abdomen is flat. Bowel sounds are normal. There is no distension.      Palpations: Abdomen is soft. There is no mass.      Tenderness: There is no abdominal tenderness. There is no guarding or rebound.      Hernia: No hernia is present.   Musculoskeletal:      Cervical back: Normal range of motion and neck supple. No rigidity. No muscular tenderness.      Right lower leg: No edema.      Left lower leg: No edema.   Lymphadenopathy:      Cervical: No cervical adenopathy.   Skin:     General: Skin is warm and dry.      Capillary Refill: Capillary refill takes less than 2 seconds.      Coloration: Skin is not jaundiced or pale.      Findings: No bruising, erythema, lesion or " rash.   Neurological:      General: No focal deficit present.      Mental Status: She is alert and oriented to person, place, and time. Mental status is at baseline.   Psychiatric:         Mood and Affect: Mood normal.         Behavior: Behavior normal.         Thought Content: Thought content normal.         Judgment: Judgment normal.             Diagnoses and all orders for this visit:    1. Annual physical exam (Primary)  -     CBC w AUTO Differential; Future  -     Comprehensive metabolic panel; Future  -     TSH  -     Lipid Panel With LDL/HDL Ratio; Future  -     Hemoglobin A1c    2. Encounter to establish care    3. Need for vaccination  -     Tdap Vaccine Greater Than or Equal To 6yo IM  -     FluLaval/Fluarix/Fluzone >6 Months    4. Seizure disorder ([facility])    5. Chronic migraine without aura without status migrainosus, not intractable    6. Bipolar 1 disorder, manic, moderate ([facility])  -     lamoTRIgine (LaMICtal) 25 MG tablet; Take 1 tablet by mouth 2 (Two) Times a Day.  Dispense: 60 tablet; Refill: 1    7. Anxiety  -     DULoxetine (CYMBALTA) 30 MG capsule; Take 1 capsule by mouth Daily.  Dispense: 30 capsule; Refill: 1  -     hydrOXYzine pamoate (Vistaril) 25 MG capsule; Take 1 capsule by mouth 3 (Three) Times a Day As Needed for Anxiety.  Dispense: 90 capsule; Refill: 1    8. Major depressive disorder, recurrent episode, moderate degree ([facility])  -     ARIPiprazole (Abilify) 5 MG tablet; Take 1 tablet by mouth Daily.  Dispense: 30 tablet; Refill: 1  -     DULoxetine (CYMBALTA) 30 MG capsule; Take 1 capsule by mouth Daily.  Dispense: 30 capsule; Refill: 1    9. Factor V Leiden mutation ([facility])    10. RUQ abdominal pain  -     US Abdomen Complete; Future    11. Lipoma, unspecified site  Comments:  RUQ of abdomen  Orders:  -     US Abdomen Complete; Future    12. Smoker  -     nicotine (Nicoderm CQ) 21 MG/24HR patch; Place 1 patch on the skin as directed by provider Daily.  Dispense: 30 patch; Refill: 3    13.  Marijuana use       Patient establish care today.  Annual physical exam performed.  Patient counseled on needed immunizations today.  She received the Tdap and flu shots while in office today.  She tolerated well without complications.  She is undecided if she wants the COVID-19 vaccine as of yet.  Unfortunately, we are currently out of pneumococcal vaccines we will address this at a future appointment.  Patient advised to keep her follow-up appointments with neurology for her seizure disorder and migraines.  For the bipolar 1 disorder, will start patient on Lamictal 25 mg p.o. twice daily.  For the anxiety and depression we will start patient on Cymbalta, Vistaril, and Abilify.  Patient instructed on how to take these medications and possible side effects.  Patient's PHQ-9 score is 21 and BRADLY-7 score is 20.  She is dealing with significant depression and anxiety at present.  For the factor V Leiden mutation patient encouraged to continue daily 81 mg aspirin.  For the right upper quadrant abdominal pain with history of lipoma of the right upper quadrant of the abdomen, will obtain an ultrasound of the abdomen since it does radiate to the left side of the abdomen.  Smoking cessation counseling of less than 3 minutes provided today.  Patient willing to try to quit smoking.  NicoDerm patches sent in for patient.  Patient is due for a Pap smear.  She states she will schedule this with MARKELL Cleaning on her way out today.  Patient is to follow-up in 1 month or sooner if needed.  At that time we will recheck her depression, anxiety and bipolar symptoms.  Answered all questions.  Patient verbalized understanding of plan of care.          This document has been electronically signed by MAGDALENO Rodriguez on October 24, 2022 15:06 CDT

## 2022-11-01 ENCOUNTER — LAB (OUTPATIENT)
Dept: LAB | Facility: OTHER | Age: 27
End: 2022-11-01

## 2022-11-01 DIAGNOSIS — Z00.00 ANNUAL PHYSICAL EXAM: ICD-10-CM

## 2022-11-01 LAB
ALBUMIN SERPL-MCNC: 4.4 G/DL (ref 3.5–5)
ALBUMIN/GLOB SERPL: 1.6 G/DL (ref 1.1–1.8)
ALP SERPL-CCNC: 100 U/L (ref 38–126)
ALT SERPL W P-5'-P-CCNC: 9 U/L
ANION GAP SERPL CALCULATED.3IONS-SCNC: 7 MMOL/L (ref 5–15)
AST SERPL-CCNC: 22 U/L (ref 14–36)
BASOPHILS # BLD AUTO: 0.04 10*3/MM3 (ref 0–0.2)
BASOPHILS NFR BLD AUTO: 0.5 % (ref 0–1.5)
BILIRUB SERPL-MCNC: 0.6 MG/DL (ref 0.2–1.3)
BUN SERPL-MCNC: 7 MG/DL (ref 7–23)
BUN/CREAT SERPL: 9.2 (ref 7–25)
CALCIUM SPEC-SCNC: 9.2 MG/DL (ref 8.4–10.2)
CHLORIDE SERPL-SCNC: 109 MMOL/L (ref 101–112)
CHOLEST SERPL-MCNC: 154 MG/DL (ref 0–200)
CO2 SERPL-SCNC: 26 MMOL/L (ref 22–30)
CREAT SERPL-MCNC: 0.76 MG/DL (ref 0.52–1.04)
DEPRECATED RDW RBC AUTO: 40.4 FL (ref 37–54)
EGFRCR SERPLBLD CKD-EPI 2021: 110.3 ML/MIN/1.73
EOSINOPHIL # BLD AUTO: 0.13 10*3/MM3 (ref 0–0.4)
EOSINOPHIL NFR BLD AUTO: 1.7 % (ref 0.3–6.2)
ERYTHROCYTE [DISTWIDTH] IN BLOOD BY AUTOMATED COUNT: 11.9 % (ref 12.3–15.4)
GLOBULIN UR ELPH-MCNC: 2.7 GM/DL (ref 2.3–3.5)
GLUCOSE SERPL-MCNC: 95 MG/DL (ref 70–99)
HBA1C MFR BLD: 5.1 % (ref 4.8–5.6)
HCT VFR BLD AUTO: 38.6 % (ref 34–46.6)
HDLC SERPL-MCNC: 43 MG/DL (ref 40–60)
HGB BLD-MCNC: 12.5 G/DL (ref 12–15.9)
LDLC SERPL CALC-MCNC: 97 MG/DL (ref 0–100)
LDLC/HDLC SERPL: 2.25 {RATIO}
LYMPHOCYTES # BLD AUTO: 3.39 10*3/MM3 (ref 0.7–3.1)
LYMPHOCYTES NFR BLD AUTO: 45.4 % (ref 19.6–45.3)
MCH RBC QN AUTO: 30.2 PG (ref 26.6–33)
MCHC RBC AUTO-ENTMCNC: 32.4 G/DL (ref 31.5–35.7)
MCV RBC AUTO: 93.2 FL (ref 79–97)
MONOCYTES # BLD AUTO: 0.59 10*3/MM3 (ref 0.1–0.9)
MONOCYTES NFR BLD AUTO: 7.9 % (ref 5–12)
NEUTROPHILS NFR BLD AUTO: 3.32 10*3/MM3 (ref 1.7–7)
NEUTROPHILS NFR BLD AUTO: 44.5 % (ref 42.7–76)
PLATELET # BLD AUTO: 247 10*3/MM3 (ref 140–450)
PMV BLD AUTO: 10.9 FL (ref 6–12)
POTASSIUM SERPL-SCNC: 4.4 MMOL/L (ref 3.4–5)
PROT SERPL-MCNC: 7.1 G/DL (ref 6.3–8.6)
RBC # BLD AUTO: 4.14 10*6/MM3 (ref 3.77–5.28)
SODIUM SERPL-SCNC: 142 MMOL/L (ref 137–145)
TRIGL SERPL-MCNC: 72 MG/DL (ref 0–150)
TSH SERPL DL<=0.05 MIU/L-ACNC: 1.57 UIU/ML (ref 0.27–4.2)
VLDLC SERPL-MCNC: 14 MG/DL (ref 5–40)
WBC NRBC COR # BLD: 7.47 10*3/MM3 (ref 3.4–10.8)

## 2022-11-01 PROCEDURE — 83036 HEMOGLOBIN GLYCOSYLATED A1C: CPT | Performed by: NURSE PRACTITIONER

## 2022-11-01 PROCEDURE — 80053 COMPREHEN METABOLIC PANEL: CPT | Performed by: NURSE PRACTITIONER

## 2022-11-01 PROCEDURE — 84443 ASSAY THYROID STIM HORMONE: CPT | Performed by: NURSE PRACTITIONER

## 2022-11-01 PROCEDURE — 80061 LIPID PANEL: CPT | Performed by: NURSE PRACTITIONER

## 2022-11-01 PROCEDURE — 85025 COMPLETE CBC W/AUTO DIFF WBC: CPT | Performed by: NURSE PRACTITIONER

## 2022-11-21 DIAGNOSIS — F41.9 ANXIETY: Chronic | ICD-10-CM

## 2022-11-21 DIAGNOSIS — F31.12 BIPOLAR 1 DISORDER, MANIC, MODERATE: Chronic | ICD-10-CM

## 2022-11-21 DIAGNOSIS — F33.1 MAJOR DEPRESSIVE DISORDER, RECURRENT EPISODE, MODERATE DEGREE: Chronic | ICD-10-CM

## 2022-11-21 RX ORDER — DULOXETIN HYDROCHLORIDE 30 MG/1
30 CAPSULE, DELAYED RELEASE ORAL DAILY
Qty: 30 CAPSULE | Refills: 1 | OUTPATIENT
Start: 2022-11-21

## 2022-11-21 RX ORDER — LAMOTRIGINE 25 MG/1
25 TABLET ORAL 2 TIMES DAILY
Qty: 60 TABLET | Refills: 1 | OUTPATIENT
Start: 2022-11-21

## 2022-11-21 RX ORDER — HYDROXYZINE PAMOATE 25 MG/1
25 CAPSULE ORAL 3 TIMES DAILY PRN
Qty: 90 CAPSULE | Refills: 1 | OUTPATIENT
Start: 2022-11-21

## 2022-11-21 RX ORDER — ARIPIPRAZOLE 5 MG/1
5 TABLET ORAL DAILY
Qty: 30 TABLET | Refills: 1 | OUTPATIENT
Start: 2022-11-21

## 2022-11-22 ENCOUNTER — LAB (OUTPATIENT)
Dept: LAB | Facility: OTHER | Age: 27
End: 2022-11-22

## 2022-11-22 ENCOUNTER — OFFICE VISIT (OUTPATIENT)
Dept: OBSTETRICS AND GYNECOLOGY | Facility: CLINIC | Age: 27
End: 2022-11-22

## 2022-11-22 VITALS
WEIGHT: 109.6 LBS | SYSTOLIC BLOOD PRESSURE: 82 MMHG | BODY MASS INDEX: 18.71 KG/M2 | HEART RATE: 100 BPM | HEIGHT: 64 IN | DIASTOLIC BLOOD PRESSURE: 60 MMHG

## 2022-11-22 DIAGNOSIS — N76.0 BV (BACTERIAL VAGINOSIS): ICD-10-CM

## 2022-11-22 DIAGNOSIS — B96.89 BV (BACTERIAL VAGINOSIS): ICD-10-CM

## 2022-11-22 DIAGNOSIS — Z01.419 ENCOUNTER FOR ROUTINE GYNECOLOGICAL EXAMINATION WITH PAPANICOLAOU SMEAR OF CERVIX: Primary | ICD-10-CM

## 2022-11-22 PROBLEM — Z87.39: Status: ACTIVE | Noted: 2018-03-22

## 2022-11-22 PROCEDURE — 2014F MENTAL STATUS ASSESS: CPT | Performed by: NURSE PRACTITIONER

## 2022-11-22 PROCEDURE — 3008F BODY MASS INDEX DOCD: CPT | Performed by: NURSE PRACTITIONER

## 2022-11-22 PROCEDURE — 99213 OFFICE O/P EST LOW 20 MIN: CPT | Performed by: NURSE PRACTITIONER

## 2022-11-22 PROCEDURE — 99395 PREV VISIT EST AGE 18-39: CPT | Performed by: NURSE PRACTITIONER

## 2022-11-22 PROCEDURE — 87210 SMEAR WET MOUNT SALINE/INK: CPT | Performed by: NURSE PRACTITIONER

## 2022-11-22 RX ORDER — FLUCONAZOLE 150 MG/1
TABLET ORAL
Qty: 2 TABLET | Refills: 0 | Status: SHIPPED | OUTPATIENT
Start: 2022-11-22

## 2022-11-22 RX ORDER — METRONIDAZOLE 500 MG/1
500 TABLET ORAL 2 TIMES DAILY
Qty: 14 TABLET | Refills: 0 | Status: SHIPPED | OUTPATIENT
Start: 2022-11-22 | End: 2022-11-29

## 2022-11-22 RX ORDER — IBUPROFEN 200 MG
200 TABLET ORAL EVERY 6 HOURS PRN
COMMUNITY
End: 2022-11-23

## 2022-11-22 NOTE — PROGRESS NOTES
Subjective   Chief Complaint   Patient presents with   • Gynecologic Exam     WWGARETH Acosta is a 27 y.o. year old  presenting to be seen for her annual exam.  Today she has no concerns.     Has epilepsy and Factor V and severe migraines. Has vagus nerve stimulator. Hx of juvenile RA. States she uses marijuana for seizures. Has had Essure procedure in 2017.     Patient's last menstrual period was 11/15/2022 (exact date).    OB History        2    Para   2    Term   2       0    AB   0    Living   2       SAB   0    IAB   0    Ectopic   0    Molar   0    Multiple   0    Live Births   2                Current birth control method: Essure.    She is sexually active.  In the past 12 months there has not been new sexual partners.  Condoms are not typically used.  She would not like to be screened for STD's at today's exam.     Past 6 month menstrual history:    Cycle Frequency: regular, predictable and consistent every 28 - 32 days   Menstrual cycle character: flow is typically normal   Cycle Duration: 5 - 7   Number of heavy days of flows: 2   Dysmenorrhea: moderate, severe and is not affecting her activities of daily living   PMS: is not affecting her activities of daily living   Intermenstrual bleeding present: no   Post-coital bleeding present: no     She exercises regularly: no.  She wears her seat belt:yes.  She has concerns about domestic violence: no.  Last colonoscopy: Never   Last DEXA: Never  Last MMG: Never  Last pap:  2016  History of abnormal PAP: No    The following portions of the patient's history were reviewed and updated as appropriate:problem list, current medications, allergies, past family history, past medical history, past social history and past surgical history.    Social History    Tobacco Use      Smoking status: Every Day        Packs/day: 0.50        Years: 10.00        Pack years: 5        Types: Cigarettes      Smokeless tobacco: Never    Social  "History     Substance and Sexual Activity   Alcohol Use No      Review of Systems   Constitutional: Negative.  Negative for chills and fever.   Respiratory: Negative.    Cardiovascular: Negative.    Gastrointestinal: Negative.  Negative for constipation and diarrhea.   Endocrine: Negative.    Genitourinary: Positive for vaginal discharge (occasionally). Negative for menstrual problem and pelvic pain.   Skin: Negative.    Neurological: Positive for dizziness, seizures and headaches (migraines). Negative for syncope and light-headedness.        Chronic, followed by neurology   Hematological:        Factor V   Psychiatric/Behavioral: Negative for suicidal ideas. The patient is nervous/anxious.         Bipolar disorder          Objective   BP (!) 82/60   Pulse 100   Ht 162.6 cm (64\")   Wt 49.7 kg (109 lb 9.6 oz)   LMP 11/15/2022 (Exact Date)   Breastfeeding No   BMI 18.81 kg/m²     General:  well developed; well nourished  no acute distress  underweight   Skin:  No suspicious lesions seen   Thyroid: not examined   Breasts:  Examined in supine position  Symmetric without masses or skin dimpling  Nipples normal without inversion, lesions or discharge  There are no palpable axillary nodes  Fibrocystic changes are present both breasts without a discrete mass   Abdomen: soft, non-tender; no masses  no umbilical or inguinal hernias are present  no hepato-splenomegaly  Normal findings: bowel sounds normal   Cardiac: Heart sounds are normal.  Regular rate and rhythm without murmur, gallop or rub.   Resp: Normal expansion.  Clear to auscultation.  No rales, rhonchi, or wheezing.   Psych: alert,oriented, in NAD with a full range of affect, normal behavior and no psychotic features   Pelvis: Uterus:  Clinical staff was present for exam  External genitalia:  normal appearance of the external genitalia including Bartholin's and Herrin's glands.  :  urethral meatus normal;  Vaginal:  normal pink mucosa without prolapse or " lesions, discharge present -  white and wet prep done: finding =  clue cells are present, pseudo-hyphae are absent, trichomonads are absent and excess WBC's are absent  Cervix:  normal appearance.  Uterus:  normal size, shape and consistency  Adnexa:  normal bimanual exam of the adnexa.     Lab Review   No data reviewed    Imaging  No data reviewed       Diagnoses and all orders for this visit:    1. Encounter for routine gynecological examination with Papanicolaou smear of cervix (Primary)  -     LIQUID-BASED PAP SMEAR, [facility] ([facility])    2. BV (bacterial vaginosis)  -     metroNIDAZOLE (FLAGYL) 500 MG tablet; Take 1 tablet by mouth 2 (Two) Times a Day for 7 days. No alcohol up to 48 hours after last dose  Dispense: 14 tablet; Refill: 0  -     fluconazole (DIFLUCAN) 150 MG tablet; Take 1 tablet PO on day 3 and day 7 of antibiotics.  Dispense: 2 tablet; Refill: 0    Pap results: I will send card in mail or call if abnormal. RTC annually for WWE or sooner PRN.     SBE encouraged monthly. MMG not indicated until 40.     Uses Essure as contraceptive. Discussed potential to resume Depo provera for period management. She will discuss with Neuro and consider in the future if periods worsen.     Discussed findings on exam and wet prep consistent with BV. Treat with Flagyl 500mg BID x 7 days. No alcohol was stressed. Take Diflucan on day 3 and 7 to prevent a secondary candida infection. Reviewed vulvar skin care guidelines for prevention of recurrence. Provided coupon on [employer].     This note was electronically signed.    Maegan Veronica, APRN  November 22, 2022

## 2022-11-23 ENCOUNTER — OFFICE VISIT (OUTPATIENT)
Dept: FAMILY MEDICINE CLINIC | Facility: CLINIC | Age: 27
End: 2022-11-23

## 2022-11-23 VITALS
HEART RATE: 77 BPM | SYSTOLIC BLOOD PRESSURE: 84 MMHG | DIASTOLIC BLOOD PRESSURE: 62 MMHG | OXYGEN SATURATION: 99 % | WEIGHT: 112 LBS | BODY MASS INDEX: 19.12 KG/M2 | HEIGHT: 64 IN

## 2022-11-23 DIAGNOSIS — M08.00 JUVENILE RHEUMATOID ARTHRITIS: Chronic | ICD-10-CM

## 2022-11-23 DIAGNOSIS — F17.200 SMOKER: Chronic | ICD-10-CM

## 2022-11-23 DIAGNOSIS — F33.42 MAJOR DEPRESSIVE DISORDER, RECURRENT, IN FULL REMISSION: Chronic | ICD-10-CM

## 2022-11-23 DIAGNOSIS — F41.9 ANXIETY: Primary | Chronic | ICD-10-CM

## 2022-11-23 DIAGNOSIS — F31.12 BIPOLAR 1 DISORDER, MANIC, MODERATE: Chronic | ICD-10-CM

## 2022-11-23 PROCEDURE — 99214 OFFICE O/P EST MOD 30 MIN: CPT | Performed by: NURSE PRACTITIONER

## 2022-11-23 RX ORDER — LAMOTRIGINE 25 MG/1
25 TABLET ORAL 2 TIMES DAILY
Qty: 60 TABLET | Refills: 5 | Status: SHIPPED | OUTPATIENT
Start: 2022-11-23

## 2022-11-23 RX ORDER — ARIPIPRAZOLE 5 MG/1
5 TABLET ORAL DAILY
Qty: 30 TABLET | Refills: 5 | Status: SHIPPED | OUTPATIENT
Start: 2022-11-23

## 2022-11-23 RX ORDER — HYDROXYZINE PAMOATE 25 MG/1
25 CAPSULE ORAL 3 TIMES DAILY PRN
Qty: 90 CAPSULE | Refills: 5 | Status: SHIPPED | OUTPATIENT
Start: 2022-11-23

## 2022-11-23 RX ORDER — DULOXETIN HYDROCHLORIDE 30 MG/1
30 CAPSULE, DELAYED RELEASE ORAL DAILY
Qty: 30 CAPSULE | Refills: 5 | Status: SHIPPED | OUTPATIENT
Start: 2022-11-23

## 2022-11-23 NOTE — PROGRESS NOTES
CC: Follow-up (1 month FU ) and Arthritis (Pain has gotten worse )      Subjective:  Cherelle Acosta is a 27 y.o. female who presents for         Patient presents to office for 1 month follow-up on anxiety, depression, and bipolar disorder.  She was started at last appointment 1 month ago on Cymbalta, Vistaril, and Abilify for the anxiety and depression.  She was also initiated on Lamictal for the bipolar disorder. She states she is feeling much better with current medications and doesn't feel they need to be increased. Denies SI/HI. States sleep is better than it was.    She continues to smoke. States she tried the patches, but they didn't help. Advised to try to cut back on smoking while wearing the patches.    Pt states she was diagnosed with Juvenile RA in the past. States all of her joints hurt. Wondering if she could get pain medication for this.     Patient's blood pressure remains low at 84/62.  Patient states this is normal for her.  She denies any symptoms.      The following portions of the patient's history were reviewed and updated as appropriate: allergies, current medications, past family history, past medical history, past social history, past surgical history and problem list.    Past Medical History:   Diagnosis Date   • Anxiety    • Arthritis    • Bleeding disorder (HCC)     factor 5   • Depression    • Epilepsy (HCC)    • Factor V Leiden mutation (HCC)    • Manic bipolar I disorder (HCC)    • Seizure (HCC)     last seizure 2 weeks ago         Current Outpatient Medications:   •  Aimovig 140 MG/ML prefilled syringe, INJECT 1 ML SUBCUTANEOUSLY ONCE A MONTH, Disp: , Rfl:   •  ARIPiprazole (Abilify) 5 MG tablet, Take 1 tablet by mouth Daily., Disp: 30 tablet, Rfl: 5  •  DULoxetine (CYMBALTA) 30 MG capsule, Take 1 capsule by mouth Daily., Disp: 30 capsule, Rfl: 5  •  fluconazole (DIFLUCAN) 150 MG tablet, Take 1 tablet PO on day 3 and day 7 of antibiotics., Disp: 2 tablet, Rfl: 0  •  hydrOXYzine  "pamoate (Vistaril) 25 MG capsule, Take 1 capsule by mouth 3 (Three) Times a Day As Needed for Anxiety., Disp: 90 capsule, Rfl: 5  •  lacosamide (VIMPAT) 100 MG tablet tablet, Take 1 tablet by mouth 2 (Two) Times a Day., Disp: , Rfl:   •  lamoTRIgine (LaMICtal) 25 MG tablet, Take 1 tablet by mouth 2 (Two) Times a Day., Disp: 60 tablet, Rfl: 5  •  levETIRAcetam (KEPPRA) 1000 MG tablet, Take 1.5 tablets by mouth 2 (Two) Times a Day., Disp: , Rfl:   •  metroNIDAZOLE (FLAGYL) 500 MG tablet, Take 1 tablet by mouth 2 (Two) Times a Day for 7 days. No alcohol up to 48 hours after last dose, Disp: 14 tablet, Rfl: 0  •  nicotine (Nicoderm CQ) 21 MG/24HR patch, Place 1 patch on the skin as directed by provider Daily., Disp: 30 patch, Rfl: 3  •  diclofenac (VOLTAREN) 50 MG EC tablet, Take 1 tablet by mouth 2 (Two) Times a Day As Needed (Joint pain)., Disp: 60 tablet, Rfl: 5    Review of Systems    Review of Systems   Constitutional: Negative.    HENT: Negative.    Eyes: Negative.    Respiratory: Negative.    Cardiovascular: Negative.    Gastrointestinal: Negative.    Endocrine: Negative.    Genitourinary: Negative.    Musculoskeletal: Positive for arthralgias.   Skin: Negative.    Allergic/Immunologic: Negative.    Neurological: Negative.    Hematological: Negative.    Psychiatric/Behavioral: Negative for self-injury, sleep disturbance and suicidal ideas.   All other systems reviewed and are negative.      Objective  Vitals:    11/23/22 1009   BP: (!) 84/62   Pulse: 77   SpO2: 99%   Weight: 50.8 kg (112 lb)   Height: 162.6 cm (64\")     Body mass index is 19.22 kg/m².    Physical Exam    Physical Exam  Vitals and nursing note reviewed.   Constitutional:       General: She is not in acute distress.     Appearance: Normal appearance. She is not ill-appearing, toxic-appearing or diaphoretic.   HENT:      Head: Normocephalic and atraumatic.   Cardiovascular:      Rate and Rhythm: Normal rate and regular rhythm.      Pulses: Normal " pulses.      Heart sounds: Normal heart sounds. No murmur heard.    No friction rub. No gallop.   Pulmonary:      Effort: Pulmonary effort is normal. No respiratory distress.      Breath sounds: Normal breath sounds. No stridor. No wheezing, rhonchi or rales.   Chest:      Chest wall: No tenderness.   Musculoskeletal:      Cervical back: Normal range of motion and neck supple.   Neurological:      General: No focal deficit present.      Mental Status: She is alert and oriented to person, place, and time. Mental status is at baseline.   Psychiatric:         Attention and Perception: Attention and perception normal.         Mood and Affect: Mood and affect normal.         Speech: Speech normal.         Behavior: Behavior normal. Behavior is cooperative.         Thought Content: Thought content normal.         Cognition and Memory: Cognition normal.         Judgment: Judgment normal.      Comments: Well-kempt, good eye contact, answers questions appropriately.             Diagnoses and all orders for this visit:    1. Anxiety (Primary)  -     hydrOXYzine pamoate (Vistaril) 25 MG capsule; Take 1 capsule by mouth 3 (Three) Times a Day As Needed for Anxiety.  Dispense: 90 capsule; Refill: 5  -     DULoxetine (CYMBALTA) 30 MG capsule; Take 1 capsule by mouth Daily.  Dispense: 30 capsule; Refill: 5    2. Bipolar 1 disorder, manic, moderate (HCC)  -     lamoTRIgine (LaMICtal) 25 MG tablet; Take 1 tablet by mouth 2 (Two) Times a Day.  Dispense: 60 tablet; Refill: 5    3. Major depressive disorder, recurrent, in full remission (HCC)  -     DULoxetine (CYMBALTA) 30 MG capsule; Take 1 capsule by mouth Daily.  Dispense: 30 capsule; Refill: 5  -     ARIPiprazole (Abilify) 5 MG tablet; Take 1 tablet by mouth Daily.  Dispense: 30 tablet; Refill: 5    4. Juvenile rheumatoid arthritis (HCC)  -     diclofenac (VOLTAREN) 50 MG EC tablet; Take 1 tablet by mouth 2 (Two) Times a Day As Needed (Joint pain).  Dispense: 60 tablet; Refill:  5    5. Smoker       Patient symptoms have improved with treatment.  We will continue current medications.  For the history of juvenile rheumatoid arthritis we will start patient on diclofenac.  Patient instructed on how to take this medication and possible side effects.  She is advised to not take any other NSAIDs with this.  She may take Tylenol as needed.  If pain does not improve, patient to follow-up and will check rheumatoid labs.  Otherwise, patient to follow-up in 6 months or sooner if needed for recheck on her medical conditions.  Answered all questions.  Patient verbalized understanding of plan of care.  Smoking cessation counseling of less than 3 minutes performed today.  Patient is going to try the patches again to see if she can try to stop smoking.          This document has been electronically signed by MAGDALENO Rodriguez on November 23, 2022 10:27 CST

## 2022-11-28 LAB — REF LAB TEST METHOD: NORMAL

## 2022-12-06 ENCOUNTER — CLINICAL SUPPORT (OUTPATIENT)
Dept: OBSTETRICS AND GYNECOLOGY | Facility: CLINIC | Age: 27
End: 2022-12-06

## 2022-12-06 DIAGNOSIS — Z30.013 INITIATION OF DEPO PROVERA: Primary | ICD-10-CM

## 2022-12-06 LAB
B-HCG UR QL: NEGATIVE
EXPIRATION DATE: NORMAL
INTERNAL NEGATIVE CONTROL: NORMAL
INTERNAL POSITIVE CONTROL: NORMAL
Lab: NORMAL

## 2022-12-06 PROCEDURE — 81025 URINE PREGNANCY TEST: CPT | Performed by: NURSE PRACTITIONER

## 2022-12-06 PROCEDURE — 96372 THER/PROPH/DIAG INJ SC/IM: CPT | Performed by: NURSE PRACTITIONER

## 2022-12-06 RX ORDER — MEDROXYPROGESTERONE ACETATE 150 MG/ML
150 INJECTION, SUSPENSION INTRAMUSCULAR
Status: SHIPPED | OUTPATIENT
Start: 2022-12-06 | End: 2023-12-01

## 2022-12-06 RX ORDER — MEDROXYPROGESTERONE ACETATE 150 MG/ML
150 INJECTION, SUSPENSION INTRAMUSCULAR
Qty: 1 ML | Refills: 3 | Status: SHIPPED | OUTPATIENT
Start: 2022-12-06

## 2022-12-06 RX ADMIN — MEDROXYPROGESTERONE ACETATE 150 MG: 150 INJECTION, SUSPENSION INTRAMUSCULAR at 09:58

## 2023-01-04 ENCOUNTER — TELEPHONE (OUTPATIENT)
Dept: FAMILY MEDICINE CLINIC | Facility: CLINIC | Age: 28
End: 2023-01-04
Payer: COMMERCIAL

## 2023-01-04 NOTE — TELEPHONE ENCOUNTER
Attempted to contact pt to remind her Ayanna ordered her an u/s and to see if she would like to get this scheduled. No answer. VM not set up.

## 2023-02-10 ENCOUNTER — TELEPHONE (OUTPATIENT)
Dept: FAMILY MEDICINE CLINIC | Facility: CLINIC | Age: 28
End: 2023-02-10
Payer: COMMERCIAL

## 2023-03-15 ENCOUNTER — TELEPHONE (OUTPATIENT)
Dept: FAMILY MEDICINE CLINIC | Facility: CLINIC | Age: 28
End: 2023-03-15
Payer: COMMERCIAL

## 2023-03-15 NOTE — TELEPHONE ENCOUNTER
Attempted to contact the patient to see if she would like to schedule her ultrasound of the abdomen, no answer. Unable to leave voice message. Numerous attempts made to contact the patient by diagnostic scheduling and providers office with no answer. Letter will be mailed today 3/15/2023. Order will be selected as complete due to multiple attempts to contact.

## 2023-05-10 DIAGNOSIS — M08.00 JUVENILE RHEUMATOID ARTHRITIS: Chronic | ICD-10-CM

## 2023-05-11 RX ORDER — LACOSAMIDE 100 MG/1
100 TABLET ORAL 2 TIMES DAILY
Qty: 60 TABLET | OUTPATIENT
Start: 2023-05-11

## 2023-05-11 RX ORDER — LEVETIRACETAM 1000 MG/1
1500 TABLET ORAL 2 TIMES DAILY
OUTPATIENT
Start: 2023-05-11

## 2023-05-11 RX ORDER — LACOSAMIDE 100 MG/1
100 TABLET ORAL 2 TIMES DAILY
Qty: 60 TABLET | Refills: 0 | Status: SHIPPED | OUTPATIENT
Start: 2023-05-11

## 2023-05-11 RX ORDER — LEVETIRACETAM 1000 MG/1
1500 TABLET ORAL 2 TIMES DAILY
Qty: 90 TABLET | Refills: 0 | Status: SHIPPED | OUTPATIENT
Start: 2023-05-11

## 2023-05-23 ENCOUNTER — OFFICE VISIT (OUTPATIENT)
Dept: FAMILY MEDICINE CLINIC | Facility: CLINIC | Age: 28
End: 2023-05-23
Payer: COMMERCIAL

## 2023-05-23 ENCOUNTER — LAB (OUTPATIENT)
Dept: LAB | Facility: OTHER | Age: 28
End: 2023-05-23
Payer: COMMERCIAL

## 2023-05-23 VITALS
OXYGEN SATURATION: 98 % | HEIGHT: 64 IN | WEIGHT: 122.8 LBS | SYSTOLIC BLOOD PRESSURE: 88 MMHG | BODY MASS INDEX: 20.96 KG/M2 | HEART RATE: 86 BPM | DIASTOLIC BLOOD PRESSURE: 58 MMHG

## 2023-05-23 DIAGNOSIS — F33.42 MAJOR DEPRESSIVE DISORDER, RECURRENT, IN FULL REMISSION: ICD-10-CM

## 2023-05-23 DIAGNOSIS — G43.709 CHRONIC MIGRAINE WITHOUT AURA WITHOUT STATUS MIGRAINOSUS, NOT INTRACTABLE: ICD-10-CM

## 2023-05-23 DIAGNOSIS — F41.9 ANXIETY: ICD-10-CM

## 2023-05-23 DIAGNOSIS — F31.12 BIPOLAR 1 DISORDER, MANIC, MODERATE: Chronic | ICD-10-CM

## 2023-05-23 DIAGNOSIS — F33.42 MAJOR DEPRESSIVE DISORDER, RECURRENT, IN FULL REMISSION: Chronic | ICD-10-CM

## 2023-05-23 DIAGNOSIS — G43.709 CHRONIC MIGRAINE WITHOUT AURA WITHOUT STATUS MIGRAINOSUS, NOT INTRACTABLE: Chronic | ICD-10-CM

## 2023-05-23 DIAGNOSIS — M08.00 JUVENILE RHEUMATOID ARTHRITIS: ICD-10-CM

## 2023-05-23 DIAGNOSIS — Z79.899 HIGH RISK MEDICATION USE: Chronic | ICD-10-CM

## 2023-05-23 DIAGNOSIS — R53.83 OTHER FATIGUE: ICD-10-CM

## 2023-05-23 DIAGNOSIS — M08.00 JUVENILE RHEUMATOID ARTHRITIS: Chronic | ICD-10-CM

## 2023-05-23 DIAGNOSIS — F12.90 MARIJUANA USE: Chronic | ICD-10-CM

## 2023-05-23 DIAGNOSIS — D68.51 FACTOR V LEIDEN MUTATION: ICD-10-CM

## 2023-05-23 DIAGNOSIS — G40.909 SEIZURE DISORDER: ICD-10-CM

## 2023-05-23 DIAGNOSIS — D68.51 FACTOR V LEIDEN MUTATION: Chronic | ICD-10-CM

## 2023-05-23 DIAGNOSIS — F17.200 SMOKER: Chronic | ICD-10-CM

## 2023-05-23 DIAGNOSIS — F31.12 BIPOLAR 1 DISORDER, MANIC, MODERATE: ICD-10-CM

## 2023-05-23 DIAGNOSIS — F41.9 ANXIETY: Chronic | ICD-10-CM

## 2023-05-23 DIAGNOSIS — G40.909 SEIZURE DISORDER: Primary | Chronic | ICD-10-CM

## 2023-05-23 PROBLEM — F33.1 MAJOR DEPRESSIVE DISORDER, RECURRENT EPISODE, MODERATE DEGREE: Status: RESOLVED | Noted: 2022-10-24 | Resolved: 2023-05-23

## 2023-05-23 LAB
25(OH)D3 SERPL-MCNC: <6 NG/ML (ref 30–100)
ALBUMIN SERPL-MCNC: 4.1 G/DL (ref 3.5–5)
ALBUMIN/GLOB SERPL: 1.4 G/DL (ref 1.1–1.8)
ALP SERPL-CCNC: 100 U/L (ref 38–126)
ALT SERPL W P-5'-P-CCNC: 32 U/L
ANION GAP SERPL CALCULATED.3IONS-SCNC: 7 MMOL/L (ref 5–15)
AST SERPL-CCNC: 31 U/L (ref 14–36)
BASOPHILS # BLD AUTO: 0.04 10*3/MM3 (ref 0–0.2)
BASOPHILS NFR BLD AUTO: 0.4 % (ref 0–1.5)
BILIRUB SERPL-MCNC: 0.3 MG/DL (ref 0.2–1.3)
BUN SERPL-MCNC: 10 MG/DL (ref 7–23)
BUN/CREAT SERPL: 14.1 (ref 7–25)
CALCIUM SPEC-SCNC: 8.9 MG/DL (ref 8.4–10.2)
CHLORIDE SERPL-SCNC: 108 MMOL/L (ref 101–112)
CO2 SERPL-SCNC: 26 MMOL/L (ref 22–30)
CREAT SERPL-MCNC: 0.71 MG/DL (ref 0.52–1.04)
DEPRECATED RDW RBC AUTO: 41.9 FL (ref 37–54)
EGFRCR SERPLBLD CKD-EPI 2021: 119.7 ML/MIN/1.73
EOSINOPHIL # BLD AUTO: 0.36 10*3/MM3 (ref 0–0.4)
EOSINOPHIL NFR BLD AUTO: 3.9 % (ref 0.3–6.2)
ERYTHROCYTE [DISTWIDTH] IN BLOOD BY AUTOMATED COUNT: 12.2 % (ref 12.3–15.4)
FOLATE SERPL-MCNC: 6.4 NG/ML (ref 4.78–24.2)
GLOBULIN UR ELPH-MCNC: 2.9 GM/DL (ref 2.3–3.5)
GLUCOSE SERPL-MCNC: 78 MG/DL (ref 70–99)
HCT VFR BLD AUTO: 40.5 % (ref 34–46.6)
HGB BLD-MCNC: 13.2 G/DL (ref 12–15.9)
IRON 24H UR-MRATE: 67 MCG/DL (ref 37–145)
IRON SATN MFR SERPL: 20 % (ref 20–50)
LYMPHOCYTES # BLD AUTO: 2.85 10*3/MM3 (ref 0.7–3.1)
LYMPHOCYTES NFR BLD AUTO: 31.3 % (ref 19.6–45.3)
MCH RBC QN AUTO: 31.3 PG (ref 26.6–33)
MCHC RBC AUTO-ENTMCNC: 32.6 G/DL (ref 31.5–35.7)
MCV RBC AUTO: 96 FL (ref 79–97)
MONOCYTES # BLD AUTO: 0.61 10*3/MM3 (ref 0.1–0.9)
MONOCYTES NFR BLD AUTO: 6.7 % (ref 5–12)
NEUTROPHILS NFR BLD AUTO: 5.26 10*3/MM3 (ref 1.7–7)
NEUTROPHILS NFR BLD AUTO: 57.7 % (ref 42.7–76)
PLATELET # BLD AUTO: 273 10*3/MM3 (ref 140–450)
PMV BLD AUTO: 10.1 FL (ref 6–12)
POTASSIUM SERPL-SCNC: 4.3 MMOL/L (ref 3.4–5)
PROT SERPL-MCNC: 7 G/DL (ref 6.3–8.6)
RBC # BLD AUTO: 4.22 10*6/MM3 (ref 3.77–5.28)
SODIUM SERPL-SCNC: 141 MMOL/L (ref 137–145)
TIBC SERPL-MCNC: 340 MCG/DL (ref 298–536)
TRANSFERRIN SERPL-MCNC: 228 MG/DL (ref 200–360)
VIT B12 BLD-MCNC: 210 PG/ML (ref 211–946)
WBC NRBC COR # BLD: 9.12 10*3/MM3 (ref 3.4–10.8)

## 2023-05-23 PROCEDURE — 1160F RVW MEDS BY RX/DR IN RCRD: CPT | Performed by: NURSE PRACTITIONER

## 2023-05-23 PROCEDURE — 85025 COMPLETE CBC W/AUTO DIFF WBC: CPT | Performed by: NURSE PRACTITIONER

## 2023-05-23 PROCEDURE — 80053 COMPREHEN METABOLIC PANEL: CPT | Performed by: NURSE PRACTITIONER

## 2023-05-23 PROCEDURE — 99214 OFFICE O/P EST MOD 30 MIN: CPT | Performed by: NURSE PRACTITIONER

## 2023-05-23 PROCEDURE — 82607 VITAMIN B-12: CPT | Performed by: NURSE PRACTITIONER

## 2023-05-23 PROCEDURE — 82746 ASSAY OF FOLIC ACID SERUM: CPT | Performed by: NURSE PRACTITIONER

## 2023-05-23 PROCEDURE — 84466 ASSAY OF TRANSFERRIN: CPT | Performed by: NURSE PRACTITIONER

## 2023-05-23 PROCEDURE — 80177 DRUG SCRN QUAN LEVETIRACETAM: CPT | Performed by: NURSE PRACTITIONER

## 2023-05-23 PROCEDURE — 83540 ASSAY OF IRON: CPT | Performed by: NURSE PRACTITIONER

## 2023-05-23 PROCEDURE — 82306 VITAMIN D 25 HYDROXY: CPT | Performed by: NURSE PRACTITIONER

## 2023-05-23 PROCEDURE — 1159F MED LIST DOCD IN RCRD: CPT | Performed by: NURSE PRACTITIONER

## 2023-05-23 NOTE — PROGRESS NOTES
Answers for HPI/ROS submitted by the patient on 5/22/2023  Please describe your symptoms.: No symptoms  Have you had these symptoms before?: No  How long have you been having these symptoms?: 1-4 days  What is the primary reason for your visit?: Other    CC: Follow-up (6 month FU) and Fatigue      Subjective:  Cherelle Acosta is a 27 y.o. female who presents for         Patient presents to office for 6-month follow-up on medical conditions.  She has a seizure disorder in which she has been followed by neurology for.  She takes Keppra and Vimpat for.  She has migraines in which she is followed by neurology for.  is needing a new neurologist. States she missed an appointment while in [place] with her son for surgery and was discharged. For the migraines she takes Aimovig for.  She has factor V mutation in which she takes a daily aspirin for.  She has depression and anxiety and bipolar disorder which is treated with Abilify, Cymbalta, Vistaril, and Lamictal.  Her depression is in complete remission with medication.  She has a history of denial RA.  She takes diclofenac for.  She is a smoker.  She smokes a half a pack per day. States she used the patches, but they didn't help.  She uses marijuana. She does c/o extreme fatigue. States if she is sitting, she will fall asleep.    She is due for pneumonia vaccine today. She declines this today.      The following portions of the patient's history were reviewed and updated as appropriate: allergies, current medications, past family history, past medical history, past social history, past surgical history and problem list.    Past Medical History:   Diagnosis Date   • Anxiety    • Arthritis    • Bleeding disorder     factor 5   • Depression    • Epilepsy    • Factor V Leiden mutation    • Manic bipolar I disorder    • Seizure     last seizure 2 weeks ago         Current Outpatient Medications:   •  Aimovig 140 MG/ML prefilled syringe, INJECT 1 ML SUBCUTANEOUSLY  ONCE A MONTH, Disp: , Rfl:   •  ARIPiprazole (Abilify) 5 MG tablet, Take 1 tablet by mouth Daily., Disp: 30 tablet, Rfl: 5  •  diclofenac (VOLTAREN) 50 MG EC tablet, Take 1 tablet by mouth 2 (Two) Times a Day As Needed (Joint pain)., Disp: 60 tablet, Rfl: 5  •  DULoxetine (CYMBALTA) 30 MG capsule, Take 1 capsule by mouth Daily., Disp: 30 capsule, Rfl: 5  •  fluconazole (DIFLUCAN) 150 MG tablet, Take 1 tablet PO on day 3 and day 7 of antibiotics., Disp: 2 tablet, Rfl: 0  •  hydrOXYzine pamoate (Vistaril) 25 MG capsule, Take 1 capsule by mouth 3 (Three) Times a Day As Needed for Anxiety., Disp: 90 capsule, Rfl: 5  •  lacosamide (VIMPAT) 100 MG tablet tablet, Take 1 tablet by mouth 2 (Two) Times a Day., Disp: 60 tablet, Rfl: 0  •  lamoTRIgine (LaMICtal) 25 MG tablet, Take 1 tablet by mouth 2 (Two) Times a Day., Disp: 60 tablet, Rfl: 5  •  levETIRAcetam (KEPPRA) 1000 MG tablet, Take 1.5 tablets by mouth 2 (Two) Times a Day., Disp: 90 tablet, Rfl: 0  •  medroxyPROGESTERone (Depo-Provera) 150 MG/ML injection, Inject 1 mL into the appropriate muscle as directed by prescriber Every 3 (Three) Months., Disp: 1 mL, Rfl: 3  •  nicotine (Nicoderm CQ) 21 MG/24HR patch, Place 1 patch on the skin as directed by provider Daily., Disp: 30 patch, Rfl: 3    Current Facility-Administered Medications:   •  MedroxyPROGESTERone Acetate (DEPO-PROVERA) injection 150 mg, 150 mg, Intramuscular, Q3 Months, Maegan Veronica APRN, 150 mg at 12/06/22 0951    Review of Systems    Review of Systems   Constitutional: Positive for fatigue.   HENT: Negative.    Eyes: Negative.    Respiratory: Negative.    Cardiovascular: Negative.    Gastrointestinal: Negative.    Endocrine: Negative.    Genitourinary: Negative.    Musculoskeletal: Negative.    Skin: Negative.    Allergic/Immunologic: Negative.    Neurological: Negative.    Hematological: Negative.    Psychiatric/Behavioral: Negative.    All other systems reviewed and are  "negative.      Objective  Vitals:    05/23/23 0822   BP: (!) 88/58   Pulse: 86   SpO2: 98%   Weight: 55.7 kg (122 lb 12.8 oz)   Height: 162.6 cm (64\")     Body mass index is 21.08 kg/m².    Physical Exam    Physical Exam  Vitals and nursing note reviewed.   Constitutional:       General: She is not in acute distress.     Appearance: Normal appearance. She is not ill-appearing, toxic-appearing or diaphoretic.   HENT:      Head: Normocephalic and atraumatic.   Cardiovascular:      Rate and Rhythm: Normal rate and regular rhythm.      Pulses: Normal pulses.      Heart sounds: Normal heart sounds. No murmur heard.    No friction rub. No gallop.   Pulmonary:      Effort: Pulmonary effort is normal. No respiratory distress.      Breath sounds: Normal breath sounds. No stridor. No wheezing, rhonchi or rales.   Chest:      Chest wall: No tenderness.   Musculoskeletal:      Cervical back: Normal range of motion and neck supple.   Neurological:      Mental Status: She is alert.             Diagnoses and all orders for this visit:    1. Seizure disorder (Primary)  -     CBC w AUTO Differential; Future  -     Comprehensive metabolic panel; Future  -     Levetiracetam Level (Keppra); Future  -     Ambulatory Referral to Neurology    2. Chronic migraine without aura without status migrainosus, not intractable  -     CBC w AUTO Differential; Future  -     Comprehensive metabolic panel; Future  -     Ambulatory Referral to Neurology    3. Factor V Leiden mutation  -     CBC w AUTO Differential; Future  -     Comprehensive metabolic panel; Future    4. Major depressive disorder, recurrent, in full remission  -     CBC w AUTO Differential; Future  -     Comprehensive metabolic panel; Future    5. Anxiety  -     CBC w AUTO Differential; Future  -     Comprehensive metabolic panel; Future    6. Bipolar 1 disorder, manic, moderate  -     CBC w AUTO Differential; Future  -     Comprehensive metabolic panel; Future    7. Juvenile " rheumatoid arthritis  -     CBC w AUTO Differential; Future  -     Comprehensive metabolic panel; Future    8. Smoker    9. Marijuana use    10. High risk medication use  -     Levetiracetam Level (Keppra); Future    11. Other fatigue  -     Iron and TIBC; Future  -     Folate; Future  -     Vitamin B12; Future  -     Vitamin D 25 Hydroxy; Future       Patient in for 6-month follow-up on medical conditions.  She will have labs drawn as above on her way out.  We will also check an anemia panel due to the fatigue.  She is to continue her current medications.  She does not need refills today.  I have referred her to a new neurologist for the seizure disorder and migraines.  Smoking cessation counseling of less than 3 minutes provided today.  Patient is not interested in smoking cessation today.  She is counseled on the need for pneumonia vaccine today.  She declines this today.  She is to follow-up in 6 months or sooner if needed for annual physical exam with labs.  Answered all questions.  Patient verbalized understanding of plan of care.          This document has been electronically signed by MAGDALENO Rodriguez on May 23, 2023 08:40 CDT

## 2023-05-25 LAB — LEVETIRACETAM SERPL-MCNC: 76.4 UG/ML (ref 10–40)

## 2023-05-31 RX ORDER — ERGOCALCIFEROL 1.25 MG/1
50000 CAPSULE ORAL WEEKLY
Qty: 4 CAPSULE | Refills: 5 | Status: SHIPPED | OUTPATIENT
Start: 2023-05-31

## 2023-06-05 ENCOUNTER — CLINICAL SUPPORT (OUTPATIENT)
Dept: FAMILY MEDICINE CLINIC | Facility: CLINIC | Age: 28
End: 2023-06-05
Payer: COMMERCIAL

## 2023-06-05 DIAGNOSIS — E53.8 VITAMIN B12 DEFICIENCY: Primary | ICD-10-CM

## 2023-06-05 RX ORDER — CYANOCOBALAMIN 1000 UG/ML
1000 INJECTION, SOLUTION INTRAMUSCULAR; SUBCUTANEOUS WEEKLY
Status: SHIPPED | OUTPATIENT
Start: 2023-06-05

## 2023-06-05 RX ADMIN — CYANOCOBALAMIN 1000 MCG: 1000 INJECTION, SOLUTION INTRAMUSCULAR; SUBCUTANEOUS at 09:14

## 2023-06-19 ENCOUNTER — CLINICAL SUPPORT (OUTPATIENT)
Dept: FAMILY MEDICINE CLINIC | Facility: CLINIC | Age: 28
End: 2023-06-19
Payer: COMMERCIAL

## 2023-06-19 DIAGNOSIS — E53.8 VITAMIN B12 DEFICIENCY: Primary | ICD-10-CM

## 2023-06-19 RX ADMIN — CYANOCOBALAMIN 1000 MCG: 1000 INJECTION, SOLUTION INTRAMUSCULAR; SUBCUTANEOUS at 10:18

## 2023-08-14 DIAGNOSIS — G40.909 SEIZURE DISORDER: Primary | ICD-10-CM

## 2023-08-14 DIAGNOSIS — G43.709 CHRONIC MIGRAINE WITHOUT AURA WITHOUT STATUS MIGRAINOSUS, NOT INTRACTABLE: ICD-10-CM

## (undated) DEVICE — CYSTO/BLADDER IRRIGATION SET, REGULATING CLAMP

## (undated) DEVICE — LIGHT HANDLE: Brand: DEVON

## (undated) DEVICE — PK D AND C 60

## (undated) DEVICE — TUBING, SUCTION, 3/16" X 6', STRAIGHT: Brand: MEDLINE

## (undated) DEVICE — GLV SURG TRIUMPH ORTHO W/ALOE PF LTX 6.5 STRL

## (undated) DEVICE — PREP SOL POVIDONE/IODINE BT 4OZ

## (undated) DEVICE — GOWN,AURORA,NOREINF,RAGLAN,XL,STERILE: Brand: MEDLINE

## (undated) DEVICE — SOL IRR NACL 0.9PCT 3000ML

## (undated) DEVICE — UNDRPD BREATH 23X36 BG/10

## (undated) DEVICE — SOL IRR H2O BTL 1000ML STRL

## (undated) DEVICE — PREP PVP-I 7.5P BT 4OZ

## (undated) DEVICE — GLV SURG TRIUMPH ORTHO W/ALOE PF LTX 8 STRL

## (undated) DEVICE — TP ELAS ELASTIKON ADHS 4IN 2.5YD

## (undated) DEVICE — DRP SURG U/BUTT W/PCH BACK STRL

## (undated) DEVICE — TOWEL,OR,DSP,ST,BLUE,DLX,8/PK,10PK/CS: Brand: MEDLINE